# Patient Record
Sex: FEMALE | Race: WHITE | NOT HISPANIC OR LATINO | URBAN - NONMETROPOLITAN AREA
[De-identification: names, ages, dates, MRNs, and addresses within clinical notes are randomized per-mention and may not be internally consistent; named-entity substitution may affect disease eponyms.]

---

## 2017-02-13 NOTE — PATIENT DISCUSSION
(H25.13) Age-related nuclear cataract, bilateral - Assesment : Examination revealed cataract, visually significant. Patient is symptomatic, night vision and glare more bothersome. S/P LASIK-Myopic OU, can cause measurements to be less accurate and makes lens predictability less reliable, ORA highly recommended during surgery. Presence of astigmatism differs between glasses/CL correction and Auto K's, further testing is needed to determine if patient is a candidate for Toric IOL's. Patient is currently successful with monovision, recommend keeping OD distance and OS near at time of surgery. EDOF lenses not recommended due to previous LASIK - Plan : Risks, Benefits and Alternatives were discussed with patient at length for Cataract Surgery. Visual symptoms are consistent with Cataract findings on examination and current refraction no longer provides satisfactory vision. Patient understands and desires surgery. All questions answered. Risks, Benefits and Alternatives discussed at length for IOL placement. Doctor suggests ETP, Possible TORIC. Patient desires TBD.  Patient will need to wear glasses for TBD.  EYE: OS IOL TYPE: TBD POST OPERATIVE TARGET: -2.00/NEAR PACKAGE: ETP/POSSIBLE TORIC   Plan for OD target of PLANO RV ASCAN/RBA'S, advised patient not to get new glasses or contacts if wishes to proceed with CE

## 2017-02-13 NOTE — PATIENT DISCUSSION
(H25.013) Cortical age-related cataract, bilateral - Assesment : Examination revealed Cortical Senile Cataract.  - Plan : SEE PLAN # 1

## 2017-04-05 NOTE — PATIENT DISCUSSION
(Z96.1) Presence of intraocular lens - Assesment : Patient is Pseudophakic. ORA done in OR. - Plan : Discussed signs and symptoms of infection and retinal detachments. Do not rub operated eye. Follow drop schedule If redness,pain,decreased vision, flashes or floaters occur then contact clinic.

## 2017-04-10 NOTE — PATIENT DISCUSSION
(H25.11) Age-related nuclear cataract, right eye - Assesment : Examination revealed cataract, visually significant. Patient is symptomatic, night vision and glare more bothersome. S/P LASIK-Myopic OU, can cause measurements to be less accurate and makes lens predictability less reliable, ORA highly recommended during surgery. - Plan : Risks, Benefits and Alternatives were discussed with patient at length for Cataract Surgery. Visual symptoms are consistent with Cataract findings on examination and current refraction no longer provides satisfactory vision. Patient understands and desires surgery. Patient is currently successful with monovision, recommend keeping OD distance and OS near at time of surgery. EDOF lenses not recommended due to previous LASIK.  is borderline and may not warrant a toric lens. All questions answered. Risks, Benefits and Alternatives discussed at length for IOL placement. Doctor suggests limited focus w/ ORA. Patient desires Limited focus w/ ORA. Patient will need to wear glasses for  near/intermediate/distance.   EYE: OD IOL TYPE: Limited focus  POST OPERATIVE TARGET: [place] PACKAGE: TP

## 2017-04-10 NOTE — PATIENT DISCUSSION
(Z96.1) Presence of intraocular lens - Assesment : Patient is Pseudophakic OS w/ -2.00 target. - Plan : Discussed signs and symptoms of infection and retinal detachments. Do not rub operated eye. Follow drop schedule If redness,pain,decreased vision, flashes or floaters occur then contact clinic.

## 2017-04-25 NOTE — PATIENT DISCUSSION
(Z96.1) Presence of intraocular lens - Assesment : Patient is Pseudophakic. - Plan : Discussed signs and symptoms of infection and retinal detachments. Do not rub operated eye. Follow drop schedule If redness,pain,decreased vision, flashes or floaters occur then contact clinic.  3-4 weeks final post op

## 2017-05-15 NOTE — PATIENT DISCUSSION
(Z96.1) Presence of intraocular lens - Assesment : Patient is Pseudophakic OU. Patient having trouble adapting to monovision. - Plan : Advised to continue with CL trial and if not tolerated well patient may consider IOL exchange OS for distance vision. Discussed signs and symptoms of infection and retinal detachments. Do not rub operated eye. If redness,pain,decreased vision, flashes or floaters occur then contact clinic. RV October Exam/Pach.

## 2017-11-21 NOTE — PATIENT DISCUSSION
(N44.677) Other secondary cataract, left eye - Assesment : Posterior capsule opacification present. Patient is currently asymptomatic and functioning well. - Plan : Monitor for Changes. Advised patient to call our office with decreased vision or increased symptoms. RV 6 months Exam. Recommend dilation.

## 2017-11-21 NOTE — PATIENT DISCUSSION
(Z96.1) Presence of intraocular lens - Assesment : Patient is Pseudophakic OU. Monovision. Patient happy. - Plan : Monitor for changes.

## 2018-05-29 NOTE — PATIENT DISCUSSION
(U63.617) Keratoconjunct sicca, not specified as Sjogren's, bilateral - Assesment : Examination revealed Dry Eye Syndrome OU may be contributing to the decrease in vision at distance and near OU. - Plan : Monitor for changes. Advised patient to call our office with decreased vision or increased symptoms. Recommend that use of ATs PRN  especially before reading. Suggestion: Blink,  Thera tears, Optive and GenTeal drops. Handout given.   RTC 1 year/EXAM

## 2019-05-16 NOTE — PATIENT DISCUSSION
(I46.089) Keratoconjunct sicca, not specified as Sjogren's, bilateral - Assesment : Examination revealed Dry Eye Syndrome OU. Reduced TF OU. - Plan : Recommend artificial tears 2-3 times daily OU. Monitor for changes. Advised patient to call our office with decreased vision or increased symptoms. RV 1 year Exam/MAC OCT.

## 2019-05-16 NOTE — PATIENT DISCUSSION
(Z96.1) Presence of intraocular lens - Assesment : Examination revealed patient is Pseudophakic OU. monovision -OS near - Plan : Monitor for changes. Advised patient to call our office with decreased vision or increased symptoms.

## 2019-07-02 ENCOUNTER — IMPORTED ENCOUNTER (OUTPATIENT)
Dept: URBAN - NONMETROPOLITAN AREA CLINIC 1 | Facility: CLINIC | Age: 44
End: 2019-07-02

## 2019-07-02 PROBLEM — H40.013: Noted: 2018-07-13

## 2019-07-02 PROBLEM — H10.421: Noted: 2019-07-02

## 2019-07-02 PROCEDURE — 92012 INTRM OPH EXAM EST PATIENT: CPT

## 2019-07-02 NOTE — PATIENT DISCUSSION
chronic conj od>oseducate ptstart maxitrol bid od x 1wkconsider change in brands of cl'strials given today of av norberto and pt to try let us know on next visit if they want to changekeep pey[place] appt. *GLAUCOMA SUSPECT/OCULAR HYPERTENSION:.-  Discussed findings of exam in detail with the patient. -  discussed the risk of glaucoma and the importance of monitoring for this disease.

## 2019-09-26 ENCOUNTER — IMPORTED ENCOUNTER (OUTPATIENT)
Dept: URBAN - NONMETROPOLITAN AREA CLINIC 1 | Facility: CLINIC | Age: 44
End: 2019-09-26

## 2019-09-26 PROBLEM — H10.421: Noted: 2019-07-02

## 2019-09-26 PROBLEM — H52.13: Noted: 2019-09-26

## 2019-09-26 PROBLEM — H40.013: Noted: 2018-07-13

## 2019-09-26 PROCEDURE — 92015 DETERMINE REFRACTIVE STATE: CPT

## 2019-09-26 PROCEDURE — 92310 CONTACT LENS FITTING OU: CPT

## 2019-09-26 PROCEDURE — 92014 COMPRE OPH EXAM EST PT 1/>: CPT

## 2019-09-26 NOTE — PATIENT DISCUSSION
chronic conj od>oseducate ptstart maxitrol bid od x 1wk*GLAUCOMA SUSPECT/OCULAR HYPERTENSION:.-  Discussed findings of exam in detail with the patient. -  discussed the risk of glaucoma and the importance of monitoring for this disease. Myopia-Discussed diagnosis with patient. -Explained that people who are myopic are at a higher risk for developing RD/RT and reviewed associated S&S.-Pt to contact our office if symptoms develop. Updated spec Rx given. Recommend lens that will provide comfort as well as protect safety and health of eyes. CL wear-CLs fit and center well.-Stressed that patient should not sleep in CL. -Updated CL Rx given. -CL care and precautions given.

## 2020-06-23 NOTE — PATIENT DISCUSSION
(H35.721) Drusen (degenerative) of macula, bilateral - Assesment : Examination revealed Drusen OU. - Plan : Monitor for changes. Advised patient to call our office with decreased vision or increased symptoms.

## 2020-06-23 NOTE — PATIENT DISCUSSION
(T80.221) Keratoconjunct sicca, not specified as Sjogren's, bilateral - Assesment : Examination revealed Dry Eye Syndrome OU. Reduced TF OU. - Plan : Recommend artificial tears 2-3 times daily OU. Monitor for changes. Advised patient to call our office with decreased vision or increased symptoms. RV 1 year Exam/MAC OCT.

## 2021-01-20 ENCOUNTER — IMPORTED ENCOUNTER (OUTPATIENT)
Dept: URBAN - NONMETROPOLITAN AREA CLINIC 1 | Facility: CLINIC | Age: 46
End: 2021-01-20

## 2021-01-20 PROBLEM — H52.13: Noted: 2021-01-20

## 2021-01-20 PROBLEM — H40.013: Noted: 2021-01-20

## 2021-01-20 PROCEDURE — V2520 CONTACT LENS HYDROPHILIC: HCPCS

## 2021-01-20 PROCEDURE — 92015 DETERMINE REFRACTIVE STATE: CPT

## 2021-01-20 PROCEDURE — 92310 CONTACT LENS FITTING OU: CPT

## 2021-01-20 PROCEDURE — 92014 COMPRE OPH EXAM EST PT 1/>: CPT

## 2021-01-20 NOTE — PATIENT DISCUSSION
*GLAUCOMA SUSPECT/OCULAR HYPERTENSION:.-  Discussed findings of exam in detail with the patient. -  discussed the risk of glaucoma and the importance of monitoring for this disease. Myopia-Discussed diagnosis with patient. -Explained that people who are myopic are at a higher risk for developing RD/RT and reviewed associated S&S.-Pt to contact our office if symptoms develop. Updated spec Rx given. Recommend lens that will provide comfort as well as protect safety and health of eyes. CL wear-CLs fit and center well.-Stressed that patient should not sleep in CL. -Updated CL Rx given. -CL care and precautions given.

## 2021-05-22 NOTE — PATIENT DISCUSSION
(H25.13) Age-related nuclear cataract, bilateral - Assesment : Examination revealed cataract, visually significant. Patient is symptomatic, night vision and glare more bothersome. S/P LASIK-Myopic OU, can cause measurements to be less accurate and makes lens predictability less reliable, ORA highly recommended during surgery. - Plan : Risks, Benefits and Alternatives were discussed with patient at length for Cataract Surgery. Visual symptoms are consistent with Cataract findings on examination and current refraction no longer provides satisfactory vision. Patient understands and desires surgery. Patient is currently successful with monovision, recommend keeping OD distance and OS near at time of surgery. EDOF lenses not recommended due to previous LASIK. Discussed that OD is likely to need a Toric lens. OS is borderline and may not warrant a toric lens. G4 is not working today, patient will return to do topography measurement. All questions answered. Risks, Benefits and Alternatives discussed at length for IOL placement. Doctor suggests limited focus w/ ORA. Patient desires Limited focus w/ ORA. Patient will need to wear glasses for  near/intermediate/distance. EYE: OS IOL TYPE: Limited focus  POST OPERATIVE TARGET: -2.00/NEAR PACKAGE: TP  OD to follow - target of PLANO RV for G4. G4 is not working today.
oral

## 2022-04-09 ASSESSMENT — TONOMETRY
OS_IOP_MMHG: 20
OD_IOP_MMHG: 18
OS_IOP_MMHG: 20
OD_IOP_MMHG: 20

## 2022-04-09 ASSESSMENT — VISUAL ACUITY
OS_SC: 20/20
OU_CC: 20/20
OU_SC: J1+
OD_SC: 20/20
OU_SC: 20/20
OD_SC: 20/20
OS_SC: 20/20
OD_SC: 20/25-2
OS_SC: 20/30+2

## 2022-04-21 ENCOUNTER — ESTABLISHED PATIENT (OUTPATIENT)
Dept: RURAL CLINIC 1 | Facility: CLINIC | Age: 47
End: 2022-04-21

## 2022-04-21 DIAGNOSIS — H40.013: ICD-10-CM

## 2022-04-21 DIAGNOSIS — H52.13: ICD-10-CM

## 2022-04-21 PROCEDURE — 92014 COMPRE OPH EXAM EST PT 1/>: CPT

## 2022-04-21 PROCEDURE — 92015 DETERMINE REFRACTIVE STATE: CPT

## 2022-04-21 PROCEDURE — 92310-E CONTACT LENS FITTING ESTABLISH PATIENT

## 2022-04-21 PROCEDURE — 92133 CPTRZD OPH DX IMG PST SGM ON: CPT

## 2022-04-21 ASSESSMENT — TONOMETRY
OD_IOP_MMHG: 16
OS_IOP_MMHG: 16

## 2022-04-21 ASSESSMENT — VISUAL ACUITY
OS_CC: 20/25
OD_CC: 20/25